# Patient Record
Sex: FEMALE | Race: WHITE | NOT HISPANIC OR LATINO | Employment: FULL TIME | URBAN - METROPOLITAN AREA
[De-identification: names, ages, dates, MRNs, and addresses within clinical notes are randomized per-mention and may not be internally consistent; named-entity substitution may affect disease eponyms.]

---

## 2019-01-11 ENCOUNTER — HOSPITAL ENCOUNTER (OUTPATIENT)
Dept: CT IMAGING | Facility: HOSPITAL | Age: 54
Discharge: HOME | End: 2019-01-11
Attending: INTERNAL MEDICINE | Admitting: INTERNAL MEDICINE

## 2020-03-05 ENCOUNTER — APPOINTMENT (OUTPATIENT)
Dept: GENERAL RADIOLOGY | Facility: HOSPITAL | Age: 55
End: 2020-03-05

## 2020-03-05 ENCOUNTER — HOSPITAL ENCOUNTER (EMERGENCY)
Facility: HOSPITAL | Age: 55
Discharge: HOME | End: 2020-03-05
Attending: EMERGENCY MEDICINE | Admitting: EMERGENCY MEDICINE

## 2020-03-05 VITALS
SYSTOLIC BLOOD PRESSURE: 93 MMHG | HEIGHT: 62 IN | DIASTOLIC BLOOD PRESSURE: 58 MMHG | WEIGHT: 133.38 LBS | RESPIRATION RATE: 19 BRPM | TEMPERATURE: 97.5 F | HEART RATE: 83 BPM | BODY MASS INDEX: 24.54 KG/M2 | OXYGEN SATURATION: 98 %

## 2020-03-05 DIAGNOSIS — R10.13 EPIGASTRIC PAIN: ICD-10-CM

## 2020-03-05 DIAGNOSIS — R07.9 CHEST PAIN, UNSPECIFIED TYPE: Primary | ICD-10-CM

## 2020-03-05 LAB
ALBUMIN SERPL-MCNC: 4.5 G/DL (ref 3.5–5.2)
ALBUMIN/GLOB SERPL: 1.6 G/DL
ALP SERPL-CCNC: 62 U/L (ref 39–117)
ALT SERPL W P-5'-P-CCNC: 29 U/L (ref 1–33)
ANION GAP SERPL CALCULATED.3IONS-SCNC: 11 MMOL/L (ref 5–15)
AST SERPL-CCNC: 43 U/L (ref 1–32)
BASOPHILS # BLD AUTO: 0.1 10*3/MM3 (ref 0–0.2)
BASOPHILS NFR BLD AUTO: 0.7 % (ref 0–1.5)
BILIRUB SERPL-MCNC: 0.9 MG/DL (ref 0.2–1.2)
BUN BLD-MCNC: 21 MG/DL (ref 6–20)
BUN/CREAT SERPL: 31.8 (ref 7–25)
CALCIUM SPEC-SCNC: 9.1 MG/DL (ref 8.6–10.5)
CHLORIDE SERPL-SCNC: 103 MMOL/L (ref 98–107)
CO2 SERPL-SCNC: 25 MMOL/L (ref 22–29)
CREAT BLD-MCNC: 0.66 MG/DL (ref 0.57–1)
D DIMER PPP FEU-MCNC: 0.35 MCGFEU/ML (ref 0.17–0.59)
DEPRECATED RDW RBC AUTO: 42.4 FL (ref 37–54)
EOSINOPHIL # BLD AUTO: 0 10*3/MM3 (ref 0–0.4)
EOSINOPHIL NFR BLD AUTO: 0.2 % (ref 0.3–6.2)
ERYTHROCYTE [DISTWIDTH] IN BLOOD BY AUTOMATED COUNT: 13.8 % (ref 12.3–15.4)
GFR SERPL CREATININE-BSD FRML MDRD: 93 ML/MIN/1.73
GLOBULIN UR ELPH-MCNC: 2.9 GM/DL
GLUCOSE BLD-MCNC: 119 MG/DL (ref 65–99)
HCT VFR BLD AUTO: 43.4 % (ref 34–46.6)
HGB BLD-MCNC: 15.1 G/DL (ref 12–15.9)
HOLD SPECIMEN: NORMAL
LYMPHOCYTES # BLD AUTO: 0.1 10*3/MM3 (ref 0.7–3.1)
LYMPHOCYTES NFR BLD AUTO: 1.9 % (ref 19.6–45.3)
MCH RBC QN AUTO: 30.4 PG (ref 26.6–33)
MCHC RBC AUTO-ENTMCNC: 34.9 G/DL (ref 31.5–35.7)
MCV RBC AUTO: 87.3 FL (ref 79–97)
MONOCYTES # BLD AUTO: 0.3 10*3/MM3 (ref 0.1–0.9)
MONOCYTES NFR BLD AUTO: 4.2 % (ref 5–12)
NEUTROPHILS # BLD AUTO: 7 10*3/MM3 (ref 1.7–7)
NEUTROPHILS NFR BLD AUTO: 93 % (ref 42.7–76)
NRBC BLD AUTO-RTO: 0 /100 WBC (ref 0–0.2)
PLATELET # BLD AUTO: 229 10*3/MM3 (ref 140–450)
PMV BLD AUTO: 7.5 FL (ref 6–12)
POTASSIUM BLD-SCNC: 3.8 MMOL/L (ref 3.5–5.2)
PROT SERPL-MCNC: 7.4 G/DL (ref 6–8.5)
RBC # BLD AUTO: 4.98 10*6/MM3 (ref 3.77–5.28)
SODIUM BLD-SCNC: 139 MMOL/L (ref 136–145)
TROPONIN T SERPL-MCNC: <0.01 NG/ML (ref 0–0.03)
TSH SERPL DL<=0.05 MIU/L-ACNC: 1.09 UIU/ML (ref 0.27–4.2)
WBC NRBC COR # BLD: 7.5 10*3/MM3 (ref 3.4–10.8)

## 2020-03-05 PROCEDURE — 84443 ASSAY THYROID STIM HORMONE: CPT | Performed by: NURSE PRACTITIONER

## 2020-03-05 PROCEDURE — 85379 FIBRIN DEGRADATION QUANT: CPT | Performed by: NURSE PRACTITIONER

## 2020-03-05 PROCEDURE — 93005 ELECTROCARDIOGRAM TRACING: CPT

## 2020-03-05 PROCEDURE — 93005 ELECTROCARDIOGRAM TRACING: CPT | Performed by: EMERGENCY MEDICINE

## 2020-03-05 PROCEDURE — 71045 X-RAY EXAM CHEST 1 VIEW: CPT

## 2020-03-05 PROCEDURE — 99284 EMERGENCY DEPT VISIT MOD MDM: CPT

## 2020-03-05 PROCEDURE — 85025 COMPLETE CBC W/AUTO DIFF WBC: CPT | Performed by: NURSE PRACTITIONER

## 2020-03-05 PROCEDURE — 93005 ELECTROCARDIOGRAM TRACING: CPT | Performed by: NURSE PRACTITIONER

## 2020-03-05 PROCEDURE — 80053 COMPREHEN METABOLIC PANEL: CPT | Performed by: NURSE PRACTITIONER

## 2020-03-05 PROCEDURE — 84484 ASSAY OF TROPONIN QUANT: CPT | Performed by: NURSE PRACTITIONER

## 2020-03-05 RX ORDER — PANTOPRAZOLE SODIUM 40 MG/1
40 TABLET, DELAYED RELEASE ORAL DAILY
Qty: 14 TABLET | Refills: 0 | Status: SHIPPED | OUTPATIENT
Start: 2020-03-05 | End: 2023-02-20

## 2020-03-05 RX ORDER — SODIUM CHLORIDE 0.9 % (FLUSH) 0.9 %
10 SYRINGE (ML) INJECTION AS NEEDED
Status: DISCONTINUED | OUTPATIENT
Start: 2020-03-05 | End: 2020-03-05 | Stop reason: HOSPADM

## 2020-03-05 NOTE — ED PROVIDER NOTES
Subjective   Patient is a 54-year-old female complaint of epigastric and chest pain.  This developed over the past 24 hours.  The pain lasts approximately 30 minutes at a time.  The pain is described as a sharp pain which is mild without radiation.  Denies cough fever shortness of breath vomiting or other complaint.          Review of Systems  Negative for headache ears throat cough fever shortness of breath vomiting diarrhea dysuria cuts weight loss or other complaint  No past medical history on file.    Allergies   Allergen Reactions   • Penicillins Anaphylaxis   • Cephalosporins Hives   • Shellfish-Derived Products Hives       No past surgical history on file.    No family history on file.    Social History     Socioeconomic History   • Marital status:      Spouse name: Not on file   • Number of children: Not on file   • Years of education: Not on file   • Highest education level: Not on file           Objective   Physical Exam  HEENT exam shows TMs to be clear.  Oropharynx comers but sclerae nonicteric.  He has no adenopathy JVD or bruits.  Lungs are clear.  Heart has a regular rate and rhythm without murmur rub or gallop.  Chest is nontender.  Abdomen is soft with minimal epigastric tenderness.  Patient has normal bowel sounds.  Back has no CVA tenderness.  M exam is no cyanosis or edema.  Procedures  My EKG interpretation shows normal sinus rhythm with no acute ST change     Results for orders placed or performed during the hospital encounter of 03/05/20   Comprehensive Metabolic Panel   Result Value Ref Range    Glucose 119 (H) 65 - 99 mg/dL    BUN 21 (H) 6 - 20 mg/dL    Creatinine 0.66 0.57 - 1.00 mg/dL    Sodium 139 136 - 145 mmol/L    Potassium 3.8 3.5 - 5.2 mmol/L    Chloride 103 98 - 107 mmol/L    CO2 25.0 22.0 - 29.0 mmol/L    Calcium 9.1 8.6 - 10.5 mg/dL    Total Protein 7.4 6.0 - 8.5 g/dL    Albumin 4.50 3.50 - 5.20 g/dL    ALT (SGPT) 29 1 - 33 U/L    AST (SGOT) 43 (H) 1 - 32 U/L    Alkaline  Phosphatase 62 39 - 117 U/L    Total Bilirubin 0.9 0.2 - 1.2 mg/dL    eGFR Non African Amer 93 >60 mL/min/1.73    Globulin 2.9 gm/dL    A/G Ratio 1.6 g/dL    BUN/Creatinine Ratio 31.8 (H) 7.0 - 25.0    Anion Gap 11.0 5.0 - 15.0 mmol/L   Troponin   Result Value Ref Range    Troponin T <0.010 0.000 - 0.030 ng/mL   D-dimer, Quantitative   Result Value Ref Range    D-Dimer, Quantitative 0.35 0.17 - 0.59 MCGFEU/mL   TSH   Result Value Ref Range    TSH 1.090 0.270 - 4.200 uIU/mL   CBC Auto Differential   Result Value Ref Range    WBC 7.50 3.40 - 10.80 10*3/mm3    RBC 4.98 3.77 - 5.28 10*6/mm3    Hemoglobin 15.1 12.0 - 15.9 g/dL    Hematocrit 43.4 34.0 - 46.6 %    MCV 87.3 79.0 - 97.0 fL    MCH 30.4 26.6 - 33.0 pg    MCHC 34.9 31.5 - 35.7 g/dL    RDW 13.8 12.3 - 15.4 %    RDW-SD 42.4 37.0 - 54.0 fl    MPV 7.5 6.0 - 12.0 fL    Platelets 229 140 - 450 10*3/mm3    Neutrophil % 93.0 (H) 42.7 - 76.0 %    Lymphocyte % 1.9 (L) 19.6 - 45.3 %    Monocyte % 4.2 (L) 5.0 - 12.0 %    Eosinophil % 0.2 (L) 0.3 - 6.2 %    Basophil % 0.7 0.0 - 1.5 %    Neutrophils, Absolute 7.00 1.70 - 7.00 10*3/mm3    Lymphocytes, Absolute 0.10 (L) 0.70 - 3.10 10*3/mm3    Monocytes, Absolute 0.30 0.10 - 0.90 10*3/mm3    Eosinophils, Absolute 0.00 0.00 - 0.40 10*3/mm3    Basophils, Absolute 0.10 0.00 - 0.20 10*3/mm3    nRBC 0.0 0.0 - 0.2 /100 WBC   Gold Top - SST   Result Value Ref Range    Extra Tube Hold for add-ons.      Xr Chest 1 View    Result Date: 3/5/2020  No acute cardiopulmonary process.  Electronically Signed By-Jaimee Sanchez On:3/5/2020 3:13 PM This report was finalized on [id number] by  Jaimee Sanchez, .        ED Course  ED Course as of Mar 05 1600   Thu Mar 05, 2020   1423 She is a 54-year-old white female comes in with complaints of chest pain off and on for 2 weeks today got worse though states around about 1130 started getting pain in her chest radiating up into her arm lasted a few minutes thing went out to her car and then it got worse  lasted 20 to 30 minutes total to get short of breath with it did get nauseated with it denies any cough no fever is not a smoker no history of DVTs no palpitations has been under a little bit of stress states really was not doing anything when the pain hit no recent surgery no leg swelling not a smoker will work-up for cardiac and rule PE    [JM]      ED Course User Index  [JOE] Tatianna Edwards, MAYUR                                           MDM  Number of Diagnoses or Management Options  Diagnosis management comments: Patient has benign physical exam.  There is no evidence acute condition based on EKG and troponin.  Metabolic panel is normal there is no evidence of infectious process.  Patient is pain-free on reexamination patient will be discharged and will be placed on Protonix.  They will follow outpatient with your family physician after an outpatient stress test is performed.       Amount and/or Complexity of Data Reviewed  Tests in the medicine section of CPT®: reviewed    Risk of Complications, Morbidity, and/or Mortality  Presenting problems: high  Diagnostic procedures: high  Management options: high    Patient Progress  Patient progress: stable      Final diagnoses:   Chest pain, unspecified type   Epigastric pain            Norris Harris MD  03/05/20 1600

## 2020-03-12 ENCOUNTER — OFFICE VISIT (OUTPATIENT)
Dept: CARDIOLOGY | Facility: CLINIC | Age: 55
End: 2020-03-12

## 2020-03-12 VITALS
SYSTOLIC BLOOD PRESSURE: 106 MMHG | HEIGHT: 62 IN | WEIGHT: 134.5 LBS | BODY MASS INDEX: 24.75 KG/M2 | HEART RATE: 67 BPM | OXYGEN SATURATION: 100 % | DIASTOLIC BLOOD PRESSURE: 67 MMHG

## 2020-03-12 DIAGNOSIS — R07.89 CHEST PRESSURE: Primary | ICD-10-CM

## 2020-03-12 PROCEDURE — 99204 OFFICE O/P NEW MOD 45 MIN: CPT | Performed by: INTERNAL MEDICINE

## 2020-03-12 NOTE — PROGRESS NOTES
Encounter Date:03/12/2020  New patient      Patient ID: Liyah Cardenas is a 54 y.o. female.    Chief Complaint:  Chest discomfort    History of Present Illness  The patient is a pleasant 54-year-old white female is seen for evaluation of chest discomfort.  Patient was at work last Thursday, 3/5/2020 when she had sudden onset of chest heaviness and tightness spreading across the chest associated with shortness of breath and sweating.  She did have some nausea prior to that.  The symptoms lasted for about 5 to 10 minutes.  Patient went to outside and into the car and subsequently came to emergency room at [facility] Where she was evaluated and released home.  Troponin levels were negative.  EKG was normal.  Patient did not have further chest discomfort.  No other associated aggravating or alleviating factors.  The symptoms were significant.    Patient was not having any palpitations, dizziness or syncope.  Denies having any headache ,abdominal pain ,nausea, vomiting , diarrhea constipation, loss of weight or loss of appetite.  Denies having any excessive bruising ,hematuria or blood in the stool.    Review of all systems negative except as indicated    .  Assessment and Plan     ]]]]]]]]]]]]]]]]]]  Impression  ========  -Chest discomfort- suggestive of angina pectoris.  Recent EKG was normal.  Troponin levels were normal.  CBC CMP and TSH were normal    - Family history of coronary artery disease.    -Status post cholecystectomy right arm surgery    -Non-smoker    -Allergic to shellfish penicillin cephalosporins  ==========  Plan  ======  Patient symptoms are concerning for angina pectoris  EKG from the emergency room showed sinus rhythm without any ischemic changes.  Ischemic cardiac work-up.  Stress Cardiolite test  Echocardiogram  Medications were reviewed and updated.  Follow-up in the office on the same day.  Further plan will depend on patient's progress.  [[[[[[[[[[[     Diagnosis Plan   1. Chest  pressure  Adult Transthoracic Echo Complete W/ Cont if Necessary Per Protocol    Stress Test With Myocardial Perfusion One Day   LAB RESULTS (LAST 7 DAYS)    CBC  Results from last 7 days   Lab Units 03/05/20  1429   WBC 10*3/mm3 7.50   RBC 10*6/mm3 4.98   HEMOGLOBIN g/dL 15.1   HEMATOCRIT % 43.4   MCV fL 87.3   PLATELETS 10*3/mm3 229       BMP  Results from last 7 days   Lab Units 03/05/20  1430   SODIUM mmol/L 139   POTASSIUM mmol/L 3.8   CHLORIDE mmol/L 103   CO2 mmol/L 25.0   BUN mg/dL 21*   CREATININE mg/dL 0.66   GLUCOSE mg/dL 119*       CMP   Results from last 7 days   Lab Units 03/05/20  1430   SODIUM mmol/L 139   POTASSIUM mmol/L 3.8   CHLORIDE mmol/L 103   CO2 mmol/L 25.0   BUN mg/dL 21*   CREATININE mg/dL 0.66   GLUCOSE mg/dL 119*   ALBUMIN g/dL 4.50   BILIRUBIN mg/dL 0.9   ALK PHOS U/L 62   AST (SGOT) U/L 43*   ALT (SGPT) U/L 29         BNP        TROPONIN  Results from last 7 days   Lab Units 03/05/20  1430   TROPONIN T ng/mL <0.010       CoAg        Creatinine Clearance  Estimated Creatinine Clearance: 81.9 mL/min (by C-G formula based on SCr of 0.66 mg/dL).    ABG        Radiology  No radiology results for the last day                The following portions of the patient's history were reviewed and updated as appropriate: allergies, current medications, past family history, past medical history, past social history, past surgical history and problem list.    Review of Systems   Constitution: Negative for malaise/fatigue.   Cardiovascular: Positive for chest pain (dull). Negative for leg swelling, palpitations and syncope.   Respiratory: Positive for shortness of breath (with exercise).    Skin: Negative for rash.   Gastrointestinal: Negative for nausea and vomiting.   Neurological: Positive for light-headedness. Negative for dizziness and numbness.         Current Outpatient Medications:   •  Acetaminophen 325 MG capsule, Take  by mouth., Disp: , Rfl:   •  pantoprazole (PROTONIX) 40 MG EC tablet, Take  "1 tablet by mouth Daily., Disp: 14 tablet, Rfl: 0    Allergies   Allergen Reactions   • Penicillins Anaphylaxis   • Cephalosporins Hives   • Shellfish-Derived Products Hives       History reviewed. No pertinent family history.    History reviewed. No pertinent surgical history.    History reviewed. No pertinent past medical history.    History reviewed. No pertinent family history.    Social History     Socioeconomic History   • Marital status:      Spouse name: Not on file   • Number of children: Not on file   • Years of education: Not on file   • Highest education level: Not on file   Tobacco Use   • Smoking status: Never Smoker   • Smokeless tobacco: Never Used   Substance and Sexual Activity   • Alcohol use: Never     Frequency: Never         Procedures      Objective:       Physical Exam    /67 (BP Location: Left arm, Patient Position: Sitting, Cuff Size: Adult)   Pulse 67   Ht 157.5 cm (62\")   Wt 61 kg (134 lb 8 oz)   SpO2 100%   BMI 24.60 kg/m²   The patient is alert, oriented and in no distress.    Vital signs as noted above.    Head and neck revealed no carotid bruits or jugular venous distension.  No thyromegaly or lymphadenopathy is present.    Lungs clear.  No wheezing.  Breath sounds are normal bilaterally.    Heart normal first and second heart sounds.  No murmur..  No pericardial rub is present.  No gallop is present.    Abdomen soft and nontender.  No organomegaly is present.    Extremities revealed good peripheral pulses without any pedal edema.    Skin warm and dry.    Musculoskeletal system is grossly normal.    CNS grossly normal.        "

## 2020-03-23 ENCOUNTER — HOSPITAL ENCOUNTER (OUTPATIENT)
Dept: CARDIOLOGY | Facility: HOSPITAL | Age: 55
Discharge: HOME | End: 2020-03-23

## 2020-03-23 ENCOUNTER — OFFICE VISIT (OUTPATIENT)
Dept: CARDIOLOGY | Facility: CLINIC | Age: 55
End: 2020-03-23

## 2020-03-23 ENCOUNTER — HOSPITAL ENCOUNTER (OUTPATIENT)
Dept: CARDIOLOGY | Facility: HOSPITAL | Age: 55
Discharge: HOME | End: 2020-03-23
Admitting: INTERNAL MEDICINE

## 2020-03-23 VITALS
SYSTOLIC BLOOD PRESSURE: 98 MMHG | BODY MASS INDEX: 25.03 KG/M2 | DIASTOLIC BLOOD PRESSURE: 67 MMHG | HEART RATE: 74 BPM | OXYGEN SATURATION: 98 % | HEIGHT: 62 IN | WEIGHT: 136 LBS

## 2020-03-23 VITALS
BODY MASS INDEX: 23.55 KG/M2 | SYSTOLIC BLOOD PRESSURE: 102 MMHG | HEIGHT: 62 IN | WEIGHT: 128 LBS | DIASTOLIC BLOOD PRESSURE: 60 MMHG

## 2020-03-23 DIAGNOSIS — R07.89 CHEST PRESSURE: ICD-10-CM

## 2020-03-23 DIAGNOSIS — R07.89 DISCOMFORT IN CHEST: Primary | ICD-10-CM

## 2020-03-23 LAB
BH CV ECHO MEAS - ACS: 2 CM
BH CV ECHO MEAS - AO MAX PG (FULL): 2 MMHG
BH CV ECHO MEAS - AO MAX PG: 6.3 MMHG
BH CV ECHO MEAS - AO MEAN PG (FULL): 1.9 MMHG
BH CV ECHO MEAS - AO MEAN PG: 4 MMHG
BH CV ECHO MEAS - AO ROOT AREA: 5.6 CM^2
BH CV ECHO MEAS - AO ROOT DIAM: 2.7 CM
BH CV ECHO MEAS - AO V2 MAX: 125.8 CM/SEC
BH CV ECHO MEAS - AO V2 MEAN: 97 CM/SEC
BH CV ECHO MEAS - AO V2 VTI: 27.8 CM
BH CV ECHO MEAS - ASC AORTA: 2.7 CM
BH CV ECHO MEAS - AVA(I,A): 2 CM^2
BH CV ECHO MEAS - AVA(I,D): 2 CM^2
BH CV ECHO MEAS - AVA(V,A): 2.4 CM^2
BH CV ECHO MEAS - AVA(V,D): 2.4 CM^2
BH CV ECHO MEAS - EDV(CUBED): 76.6 ML
BH CV ECHO MEAS - EDV(MOD-SP4): 79.1 ML
BH CV ECHO MEAS - EDV(TEICH): 80.6 ML
BH CV ECHO MEAS - EF(CUBED): 82.4 %
BH CV ECHO MEAS - EF(MOD-SP4): 68.6 %
BH CV ECHO MEAS - EF(TEICH): 75.6 %
BH CV ECHO MEAS - ESV(CUBED): 13.4 ML
BH CV ECHO MEAS - ESV(MOD-SP4): 24.9 ML
BH CV ECHO MEAS - ESV(TEICH): 19.7 ML
BH CV ECHO MEAS - FS: 44 %
BH CV ECHO MEAS - IVS/LVPW: 0.89
BH CV ECHO MEAS - IVSD: 0.7 CM
BH CV ECHO MEAS - LA DIMENSION: 3.9 CM
BH CV ECHO MEAS - LA/AO: 1.5
BH CV ECHO MEAS - LV MASS(C)D: 93.2 GRAMS
BH CV ECHO MEAS - LV MAX PG: 4.3 MMHG
BH CV ECHO MEAS - LV MEAN PG: 2.1 MMHG
BH CV ECHO MEAS - LV V1 MAX: 104 CM/SEC
BH CV ECHO MEAS - LV V1 MEAN: 67.6 CM/SEC
BH CV ECHO MEAS - LV V1 VTI: 18.9 CM
BH CV ECHO MEAS - LVIDD: 4.2 CM
BH CV ECHO MEAS - LVIDS: 2.4 CM
BH CV ECHO MEAS - LVOT AREA: 2.9 CM^2
BH CV ECHO MEAS - LVOT DIAM: 1.9 CM
BH CV ECHO MEAS - LVPWD: 0.78 CM
BH CV ECHO MEAS - MV A MAX VEL: 71.8 CM/SEC
BH CV ECHO MEAS - MV DEC SLOPE: 552.1 CM/SEC^2
BH CV ECHO MEAS - MV DEC TIME: 0.17 SEC
BH CV ECHO MEAS - MV E MAX VEL: 92.8 CM/SEC
BH CV ECHO MEAS - MV E/A: 1.3
BH CV ECHO MEAS - MV MAX PG: 3.9 MMHG
BH CV ECHO MEAS - MV MEAN PG: 1.6 MMHG
BH CV ECHO MEAS - MV V2 MAX: 98.5 CM/SEC
BH CV ECHO MEAS - MV V2 MEAN: 58.9 CM/SEC
BH CV ECHO MEAS - MV V2 VTI: 24.7 CM
BH CV ECHO MEAS - MVA(VTI): 2.3 CM^2
BH CV ECHO MEAS - RAP SYSTOLE: 3 MMHG
BH CV ECHO MEAS - RVDD: 3.2 CM
BH CV ECHO MEAS - RVSP: 16.9 MMHG
BH CV ECHO MEAS - SV(AO): 156.1 ML
BH CV ECHO MEAS - SV(CUBED): 63.1 ML
BH CV ECHO MEAS - SV(LVOT): 55.8 ML
BH CV ECHO MEAS - SV(MOD-SP4): 54.3 ML
BH CV ECHO MEAS - SV(TEICH): 60.9 ML
BH CV ECHO MEAS - TR MAX VEL: 186.2 CM/SEC
BH CV STRESS BP STAGE 1: NORMAL
BH CV STRESS BP STAGE 2: NORMAL
BH CV STRESS BP STAGE 3: NORMAL
BH CV STRESS DURATION MIN STAGE 1: 3
BH CV STRESS DURATION MIN STAGE 2: 3
BH CV STRESS DURATION MIN STAGE 3: 3
BH CV STRESS DURATION SEC STAGE 1: 0
BH CV STRESS DURATION SEC STAGE 2: 0
BH CV STRESS DURATION SEC STAGE 3: 0
BH CV STRESS GRADE STAGE 1: 10
BH CV STRESS GRADE STAGE 2: 12
BH CV STRESS GRADE STAGE 3: 14
BH CV STRESS HR STAGE 1: 102
BH CV STRESS HR STAGE 2: 123
BH CV STRESS HR STAGE 3: 138
BH CV STRESS METS STAGE 1: 5
BH CV STRESS METS STAGE 2: 7.5
BH CV STRESS METS STAGE 3: 10
BH CV STRESS PROTOCOL 1: NORMAL
BH CV STRESS RECOVERY BP: NORMAL MMHG
BH CV STRESS RECOVERY HR: 97 BPM
BH CV STRESS SPEED STAGE 1: 1.7
BH CV STRESS SPEED STAGE 2: 2.5
BH CV STRESS SPEED STAGE 3: 3.4
BH CV STRESS STAGE 1: 1
BH CV STRESS STAGE 2: 2
BH CV STRESS STAGE 3: 3
LV EF 2D ECHO EST: 60 %
MAXIMAL PREDICTED HEART RATE: 164 BPM
MAXIMAL PREDICTED HEART RATE: 164 BPM
PERCENT MAX PREDICTED HR: 85.37 %
STRESS BASELINE BP: NORMAL MMHG
STRESS BASELINE HR: 66 BPM
STRESS PERCENT HR: 100 %
STRESS POST EXERCISE DUR MIN: 9 MIN
STRESS POST EXERCISE DUR SEC: 55 SEC
STRESS POST PEAK HR: 140 BPM
STRESS TARGET HR: 139 BPM
STRESS TARGET HR: 139 BPM

## 2020-03-23 PROCEDURE — 0 TECHNETIUM SESTAMIBI: Performed by: INTERNAL MEDICINE

## 2020-03-23 PROCEDURE — A9500 TC99M SESTAMIBI: HCPCS | Performed by: INTERNAL MEDICINE

## 2020-03-23 PROCEDURE — 78452 HT MUSCLE IMAGE SPECT MULT: CPT

## 2020-03-23 PROCEDURE — 93017 CV STRESS TEST TRACING ONLY: CPT

## 2020-03-23 PROCEDURE — 99213 OFFICE O/P EST LOW 20 MIN: CPT | Performed by: INTERNAL MEDICINE

## 2020-03-23 PROCEDURE — 93306 TTE W/DOPPLER COMPLETE: CPT

## 2020-03-23 PROCEDURE — 78452 HT MUSCLE IMAGE SPECT MULT: CPT | Performed by: INTERNAL MEDICINE

## 2020-03-23 PROCEDURE — 93306 TTE W/DOPPLER COMPLETE: CPT | Performed by: INTERNAL MEDICINE

## 2020-03-23 PROCEDURE — 93018 CV STRESS TEST I&R ONLY: CPT | Performed by: INTERNAL MEDICINE

## 2020-03-23 RX ORDER — IBUPROFEN 800 MG/1
800 TABLET ORAL EVERY 6 HOURS PRN
COMMUNITY

## 2020-03-23 RX ADMIN — TECHNETIUM TC 99M SESTAMIBI 1 DOSE: 1 INJECTION INTRAVENOUS at 13:30

## 2020-03-23 RX ADMIN — TECHNETIUM TC 99M SESTAMIBI 1 DOSE: 1 INJECTION INTRAVENOUS at 15:15

## 2020-03-23 NOTE — PROGRESS NOTES
Encounter Date:03/23/2020  Last seen 3/12/2020      Patient ID: Liyah Cardenas is a 54 y.o. female.    Chief Complaint:  Chest discomfort     History of Present Illness  The patient is a pleasant 54-year-old white female is seen for evaluation of chest discomfort.  Patient was at work last Thursday, 3/5/2020 when she had sudden onset of chest heaviness and tightness spreading across the chest associated with shortness of breath and sweating.  She did have some nausea prior to that.  The symptoms lasted for about 5 to 10 minutes.  Patient went to outside and into the car and subsequently came to emergency room at [facility] Where she was evaluated and released home.  Troponin levels were negative.  EKG was normal.  Patient did not have further chest discomfort.  No other associated aggravating or alleviating factors.  The symptoms were significant.     Patient was not having any palpitations, dizziness or syncope.  Denies having any headache ,abdominal pain ,nausea, vomiting , diarrhea constipation, loss of weight or loss of appetite.  Denies having any excessive bruising ,hematuria or blood in the stool.     Review of all systems negative except as indicated     .  Assessment and Plan      ]]]]]]]]]]]]]]]]]]  Impression  ========  -Chest discomfort- suggestive of angina pectoris.  Recent EKG was normal.  Troponin levels were normal.  CBC CMP and TSH were normal    Echocardiogram-normal 3/23/2020  Stress Cardiolite test 3/20/2020-normal    - Family history of coronary artery disease.     -Status post cholecystectomy right arm surgery     -Non-smoker     -Allergic to shellfish penicillin cephalosporins  ==========  Plan  ======  Patient symptoms are concerning for angina pectoris  EKG from the emergency room showed sinus rhythm without any ischemic changes.  Ischemic cardiac work-up.  Stress Cardiolite test-normal as above  Echocardiogram-normal as above  Medications were reviewed and  updated.  Follow-up in the office in 4 weeks.  Consideration would be given for cardiac catheterization if patient has continued symptoms.  Further plan will depend on patient's progress.  [[[[[[[[[[             Diagnosis Plan   1. Discomfort in chest     LAB RESULTS (LAST 7 DAYS)    CBC        BMP        CMP         BNP        TROPONIN        CoAg        Creatinine Clearance  Estimated Creatinine Clearance: 82.2 mL/min (by C-G formula based on SCr of 0.66 mg/dL).    ABG        Radiology  No radiology results for the last day                The following portions of the patient's history were reviewed and updated as appropriate: allergies, current medications, past family history, past medical history, past social history, past surgical history and problem list.    Review of Systems   Constitution: Negative for fever and malaise/fatigue.   HENT: Negative for congestion and hearing loss.    Eyes: Negative for double vision and visual disturbance.   Cardiovascular: Negative for chest pain, claudication, dyspnea on exertion, leg swelling and syncope.   Respiratory: Negative for cough and shortness of breath.    Endocrine: Negative for cold intolerance.   Skin: Negative for color change and rash.   Musculoskeletal: Negative for arthritis and joint pain.   Gastrointestinal: Negative for abdominal pain and heartburn.   Genitourinary: Negative for hematuria.   Neurological: Negative for excessive daytime sleepiness and dizziness.   Psychiatric/Behavioral: Negative for depression. The patient is not nervous/anxious.    All other systems reviewed and are negative.        Current Outpatient Medications:   •  Acetaminophen 325 MG capsule, Take  by mouth., Disp: , Rfl:   •  ibuprofen (ADVIL,MOTRIN) 800 MG tablet, Take 800 mg by mouth Every 6 (Six) Hours As Needed for Mild Pain ., Disp: , Rfl:   •  pantoprazole (PROTONIX) 40 MG EC tablet, Take 1 tablet by mouth Daily., Disp: 14 tablet, Rfl: 0  No current facility-administered  "medications for this visit.     Facility-Administered Medications Ordered in Other Visits:   •  [COMPLETED] technetium sestamibi (CARDIOLITE) injection 1 dose, 1 dose, Intravenous, Once in imaging, Anmol Ngo MD, 1 dose at 03/23/20 7381    Allergies   Allergen Reactions   • Penicillins Anaphylaxis   • Cephalosporins Hives   • Shellfish-Derived Products Hives       Family History   Problem Relation Age of Onset   • Heart disease Father    • Heart attack Father    • Hypertension Mother        Past Surgical History:   Procedure Laterality Date   • ARM TENDON REPAIR  2015       History reviewed. No pertinent past medical history.    Family History   Problem Relation Age of Onset   • Heart disease Father    • Heart attack Father    • Hypertension Mother        Social History     Socioeconomic History   • Marital status:      Spouse name: Not on file   • Number of children: Not on file   • Years of education: Not on file   • Highest education level: Not on file   Tobacco Use   • Smoking status: Never Smoker   • Smokeless tobacco: Never Used   Substance and Sexual Activity   • Alcohol use: Never     Frequency: Never   • Drug use: Never   • Sexual activity: Defer         Procedures      Objective:       Physical Exam    BP 98/67   Pulse 74   Ht 157.5 cm (62\")   Wt 61.7 kg (136 lb)   SpO2 98%   BMI 24.87 kg/m²   The patient is alert, oriented and in no distress.    Vital signs as noted above.    Head and neck revealed no carotid bruits or jugular venous distension.  No thyromegaly or lymphadenopathy is present.    Lungs clear.  No wheezing.  Breath sounds are normal bilaterally.    Heart normal first and second heart sounds.  No murmur..  No pericardial rub is present.  No gallop is present.    Abdomen soft and nontender.  No organomegaly is present.    Extremities revealed good peripheral pulses without any pedal edema.    Skin warm and dry.    Musculoskeletal system is grossly normal.    CNS grossly " normal.

## 2020-04-20 ENCOUNTER — TELEMEDICINE (OUTPATIENT)
Dept: CARDIOLOGY | Facility: CLINIC | Age: 55
End: 2020-04-20

## 2020-04-20 VITALS — WEIGHT: 136 LBS | BODY MASS INDEX: 25.03 KG/M2 | HEIGHT: 62 IN

## 2020-04-20 DIAGNOSIS — R07.89 CHEST PRESSURE: Primary | ICD-10-CM

## 2020-04-20 PROCEDURE — 99213 OFFICE O/P EST LOW 20 MIN: CPT | Performed by: INTERNAL MEDICINE

## 2020-04-20 NOTE — PROGRESS NOTES
You have chosen to receive care through a telehealth visit.  Do you consent to use a video/audio connection for your medical care today? Yes      Encounter Date:04/20/2020  Last seen 3/23/2020      Patient ID: Liyah Cardenas is a 54 y.o. female.    Chief Complaint:  History of chest discomfort      History of Present Illness    Since I have last seen, the patient has been without any chest discomfort ,shortness of breath, palpitations, dizziness or syncope.  Denies having any headache ,abdominal pain ,nausea, vomiting , diarrhea constipation, loss of weight or loss of appetite.  Denies having any excessive bruising ,hematuria or blood in the stool.    Review of all systems negative except as indicated  Assessment and Plan       ]]]]]]]]]]]]]]]]]]  Impression  ========  -Chest discomfort- suggestive of angina pectoris.  Recent EKG was normal.  Troponin levels were normal.  CBC CMP and TSH were normal     Echocardiogram-normal 3/23/2020  Stress Cardiolite test 3/20/2020-normal     - Family history of coronary artery disease.     -Status post cholecystectomy right arm surgery     -Non-smoker     -Allergic to shellfish penicillin cephalosporins  ==========  Plan  ======  Video/telephone visit-technical issues  Patient is not having any angina pectoris or congestive heart failure  Recent ischemic cardiac work-up.-Negative  Stress Cardiolite test-normal as above  Echocardiogram-normal as above  Medications were reviewed and updated.  Follow-up in the office in 6 months  Consideration would be given for cardiac catheterization if patient has continued symptoms.  Further plan will depend on patient's progress.  [[[[[[[[[[            Diagnosis Plan   1. Chest pressure     LAB RESULTS (LAST 7 DAYS)    CBC        BMP        CMP         BNP        TROPONIN        CoAg        Creatinine Clearance  CrCl cannot be calculated (Patient's most recent lab result is older than the maximum 30 days allowed.).    ABG         Radiology  No radiology results for the last day                The following portions of the patient's history were reviewed and updated as appropriate: allergies, current medications, past family history, past medical history, past social history, past surgical history and problem list.    Review of Systems   Constitution: Negative for malaise/fatigue.   Cardiovascular: Negative for chest pain, leg swelling, palpitations and syncope.   Respiratory: Negative for shortness of breath.    Skin: Negative for rash.   Gastrointestinal: Negative for nausea and vomiting.   Neurological: Negative for dizziness, light-headedness and numbness.         Current Outpatient Medications:   •  Acetaminophen 325 MG capsule, Take  by mouth., Disp: , Rfl:   •  Cetirizine HCl (ZyrTEC Allergy) 10 MG capsule, Take  by mouth., Disp: , Rfl:   •  ibuprofen (ADVIL,MOTRIN) 800 MG tablet, Take 800 mg by mouth Every 6 (Six) Hours As Needed for Mild Pain ., Disp: , Rfl:   •  pantoprazole (PROTONIX) 40 MG EC tablet, Take 1 tablet by mouth Daily., Disp: 14 tablet, Rfl: 0    Allergies   Allergen Reactions   • Penicillins Anaphylaxis   • Cephalosporins Hives   • Shellfish-Derived Products Hives       Family History   Problem Relation Age of Onset   • Heart disease Father    • Heart attack Father    • Hypertension Mother        Past Surgical History:   Procedure Laterality Date   • ARM TENDON REPAIR  2015       History reviewed. No pertinent past medical history.    Family History   Problem Relation Age of Onset   • Heart disease Father    • Heart attack Father    • Hypertension Mother        Social History     Socioeconomic History   • Marital status:      Spouse name: Not on file   • Number of children: Not on file   • Years of education: Not on file   • Highest education level: Not on file   Tobacco Use   • Smoking status: Never Smoker   • Smokeless tobacco: Never Used   Substance and Sexual Activity   • Alcohol use: Never      "Frequency: Never   • Drug use: Never   • Sexual activity: Defer         Procedures      Objective:       Physical Exam    Ht 157.5 cm (62\")   Wt 61.7 kg (136 lb)   BMI 24.87 kg/m²   The patient is alert, oriented and in no distress.    Vital signs as noted above.    No audible shortness of breath    Speech is normal.    CNS grossly normal.        "

## 2020-10-20 ENCOUNTER — OFFICE VISIT (OUTPATIENT)
Dept: CARDIOLOGY | Facility: CLINIC | Age: 55
End: 2020-10-20

## 2020-10-20 VITALS
SYSTOLIC BLOOD PRESSURE: 110 MMHG | HEIGHT: 62 IN | OXYGEN SATURATION: 99 % | WEIGHT: 146 LBS | BODY MASS INDEX: 26.87 KG/M2 | HEART RATE: 54 BPM | DIASTOLIC BLOOD PRESSURE: 68 MMHG

## 2020-10-20 DIAGNOSIS — R07.89 DISCOMFORT IN CHEST: ICD-10-CM

## 2020-10-20 DIAGNOSIS — R07.89 CHEST PRESSURE: Primary | ICD-10-CM

## 2020-10-20 PROCEDURE — 93000 ELECTROCARDIOGRAM COMPLETE: CPT | Performed by: INTERNAL MEDICINE

## 2020-10-20 PROCEDURE — 99213 OFFICE O/P EST LOW 20 MIN: CPT | Performed by: INTERNAL MEDICINE

## 2020-10-20 NOTE — PROGRESS NOTES
Encounter Date:10/20/2020  Last seen 4/20/2020      Patient ID: Liyah Cardenas is a 55 y.o. female.    Chief Complaint:  History of chest discomfort        History of Present Illness     Since I have last seen, the patient has been without any chest discomfort ,shortness of breath, palpitations, dizziness or syncope.  Denies having any headache ,abdominal pain ,nausea, vomiting , diarrhea constipation, loss of weight or loss of appetite.  Denies having any excessive bruising ,hematuria or blood in the stool.     Review of all systems negative except as indicated.    Reviewed ROS  Assessment and Plan         ]]]]]]]]]]]]]]]]]]  Impression  ========  -Chest discomfort- suggestive of angina pectoris.-Improved.  Recent EKG was normal.  Troponin levels were normal.  CBC CMP and TSH were normal     Echocardiogram-normal 3/23/2020  Stress Cardiolite test 3/20/2020-normal     - Family history of coronary artery disease.     -Status post cholecystectomy right arm surgery     -Non-smoker     -Allergic to shellfish penicillin cephalosporins  ==========  Plan  ======  Patient is not having any angina pectoris or congestive heart failure  EKG showed sinus rhythm without any ischemic changes  Medications were reviewed and updated.  Follow-up in the office in  1 year.  Further plan will depend on patient's progress.  [[[[[[[[[[             Diagnosis Plan   1. Chest pressure     2. Discomfort in chest     LAB RESULTS (LAST 7 DAYS)    CBC        BMP        CMP         BNP        TROPONIN        CoAg        Creatinine Clearance  CrCl cannot be calculated (Patient's most recent lab result is older than the maximum 30 days allowed.).    ABG        Radiology  No radiology results for the last day                The following portions of the patient's history were reviewed and updated as appropriate: allergies, current medications, past family history, past medical history, past social history, past surgical history and problem  list.    Review of Systems   Constitution: Negative for malaise/fatigue.   Cardiovascular: Negative for chest pain, leg swelling, palpitations and syncope.   Respiratory: Negative for shortness of breath.    Skin: Negative for rash.   Gastrointestinal: Negative for nausea and vomiting.   Neurological: Negative for dizziness, light-headedness and numbness.         Current Outpatient Medications:   •  Acetaminophen 325 MG capsule, Take  by mouth., Disp: , Rfl:   •  ALOE PO, Take  by mouth., Disp: , Rfl:   •  Cetirizine HCl (ZyrTEC Allergy) 10 MG capsule, Take  by mouth., Disp: , Rfl:   •  COLLAGEN PO, Take  by mouth., Disp: , Rfl:   •  Dexchlorpheniramine-Phenyleph (STAHIST PO), Take  by mouth., Disp: , Rfl:   •  ibuprofen (ADVIL,MOTRIN) 800 MG tablet, Take 800 mg by mouth Every 6 (Six) Hours As Needed for Mild Pain ., Disp: , Rfl:   •  Unable to find, PROVITALIZE, Disp: , Rfl:   •  Unable to find, UNIVERA LIQUID XTRA DRINK, Disp: , Rfl:   •  pantoprazole (PROTONIX) 40 MG EC tablet, Take 1 tablet by mouth Daily., Disp: 14 tablet, Rfl: 0    Allergies   Allergen Reactions   • Penicillins Anaphylaxis   • Cephalosporins Hives   • Shellfish-Derived Products Hives       Family History   Problem Relation Age of Onset   • Heart disease Father    • Heart attack Father    • Hypertension Mother        Past Surgical History:   Procedure Laterality Date   • ARM TENDON REPAIR  2015       No past medical history on file.    Family History   Problem Relation Age of Onset   • Heart disease Father    • Heart attack Father    • Hypertension Mother        Social History     Socioeconomic History   • Marital status:      Spouse name: Not on file   • Number of children: Not on file   • Years of education: Not on file   • Highest education level: Not on file   Tobacco Use   • Smoking status: Never Smoker   • Smokeless tobacco: Never Used   Substance and Sexual Activity   • Alcohol use: Never     Frequency: Never   • Drug use: Never  "  • Sexual activity: Defer           ECG 12 Lead    Date/Time: 10/20/2020 10:28 AM  Performed by: Anmol Ngo MD  Authorized by: Anmol Ngo MD   Comparison: compared with previous ECG   Similar to previous ECG  Comparison to previous ECG: Normal sinus rhythm nonspecific ST-T wave changes 57/min right axis deviation no ectopy normal intervals no change from 3/5/2020                Objective:       Physical Exam    /68 (BP Location: Left arm, Patient Position: Sitting, Cuff Size: Large Adult)   Pulse 54   Ht 157.5 cm (62\")   Wt 66.2 kg (146 lb)   SpO2 99%   BMI 26.70 kg/m²   The patient is alert, oriented and in no distress.    Vital signs as noted above.    Head and neck revealed no carotid bruits or jugular venous distension.  No thyromegaly or lymphadenopathy is present.    Lungs clear.  No wheezing.  Breath sounds are normal bilaterally.    Heart normal first and second heart sounds.  No murmur..  No pericardial rub is present.  No gallop is present.    Abdomen soft and nontender.  No organomegaly is present.    Extremities revealed good peripheral pulses without any pedal edema.    Skin warm and dry.    Musculoskeletal system is grossly normal.    CNS grossly normal.    Reviewed and unchanged from last visit.        "

## 2021-05-29 ENCOUNTER — HOSPITAL ENCOUNTER (EMERGENCY)
Facility: HOSPITAL | Age: 56
Discharge: HOME | End: 2021-05-29
Admitting: EMERGENCY MEDICINE

## 2021-05-29 ENCOUNTER — APPOINTMENT (OUTPATIENT)
Dept: CT IMAGING | Facility: HOSPITAL | Age: 56
End: 2021-05-29

## 2021-05-29 VITALS
HEIGHT: 62 IN | RESPIRATION RATE: 15 BRPM | BODY MASS INDEX: 25.96 KG/M2 | WEIGHT: 141.09 LBS | OXYGEN SATURATION: 100 % | HEART RATE: 67 BPM | DIASTOLIC BLOOD PRESSURE: 68 MMHG | SYSTOLIC BLOOD PRESSURE: 115 MMHG | TEMPERATURE: 98 F

## 2021-05-29 DIAGNOSIS — S06.0X0A CONCUSSION WITHOUT LOSS OF CONSCIOUSNESS, INITIAL ENCOUNTER: Primary | ICD-10-CM

## 2021-05-29 DIAGNOSIS — S09.90XA TRAUMATIC INJURY OF HEAD, INITIAL ENCOUNTER: ICD-10-CM

## 2021-05-29 PROCEDURE — 70450 CT HEAD/BRAIN W/O DYE: CPT

## 2021-05-29 PROCEDURE — 99282 EMERGENCY DEPT VISIT SF MDM: CPT

## 2021-05-29 RX ORDER — ONDANSETRON 4 MG/1
4 TABLET, FILM COATED ORAL EVERY 6 HOURS PRN
Qty: 11 TABLET | Refills: 0 | Status: SHIPPED | OUTPATIENT
Start: 2021-05-29

## 2021-10-26 ENCOUNTER — OFFICE VISIT (OUTPATIENT)
Dept: CARDIOLOGY | Facility: CLINIC | Age: 56
End: 2021-10-26

## 2021-10-26 VITALS
DIASTOLIC BLOOD PRESSURE: 71 MMHG | HEIGHT: 62 IN | WEIGHT: 137 LBS | BODY MASS INDEX: 25.21 KG/M2 | OXYGEN SATURATION: 99 % | SYSTOLIC BLOOD PRESSURE: 108 MMHG

## 2021-10-26 DIAGNOSIS — R07.89 CHEST PRESSURE: Primary | ICD-10-CM

## 2021-10-26 PROCEDURE — 99213 OFFICE O/P EST LOW 20 MIN: CPT | Performed by: INTERNAL MEDICINE

## 2021-10-26 PROCEDURE — 93000 ELECTROCARDIOGRAM COMPLETE: CPT | Performed by: INTERNAL MEDICINE

## 2021-10-26 NOTE — PROGRESS NOTES
Encounter Date:10/26/2021  Last seen 10/12/2020      Patient ID: Liyah Cardenas is a 56 y.o. female.    Chief Complaint:    History of chest discomfort     History of Present Illness     Since I have last seen, the patient has been without any chest discomfort ,shortness of breath, palpitations, dizziness or syncope.  Denies having any headache ,abdominal pain ,nausea, vomiting , diarrhea constipation, loss of weight or loss of appetite.  Denies having any excessive bruising ,hematuria or blood in the stool.    Review of all systems negative except as indicated.    Reviewed ROS.   Assessment and Plan         ]]]]]]]]]]]]]]]]]]  Impression  ========  -Chest discomfort- suggestive of angina pectoris.-Improved.  EKG was normal.     Echocardiogram-normal 3/23/2020  Stress Cardiolite test 3/20/2020-normal     - Family history of coronary artery disease.     -Status post cholecystectomy right arm surgery     -Non-smoker     -Allergic to shellfish penicillin cephalosporins  ==========  Plan  ======  Chest discomfort-improved  Patient is not having any angina pectoris or congestive heart failure  EKG showed sinus rhythm without any ischemic changes  Medications were reviewed and updated.  Follow-up in the office as needed.  Further plan will depend on patient's progress.  [[[[[[[[[[                   Diagnosis Plan   1. Chest pressure  ECG 12 Lead   LAB RESULTS (LAST 7 DAYS)    CBC        BMP        CMP         BNP        TROPONIN        CoAg        Creatinine Clearance  CrCl cannot be calculated (Patient's most recent lab result is older than the maximum 30 days allowed.).    ABG        Radiology  No radiology results for the last day                The following portions of the patient's history were reviewed and updated as appropriate: allergies, current medications, past family history, past medical history, past social history, past surgical history and problem list.    Review of Systems   Constitutional: Negative  for malaise/fatigue.   Cardiovascular: Negative for chest pain, leg swelling, palpitations and syncope.   Respiratory: Negative for shortness of breath.    Skin: Negative for rash.   Gastrointestinal: Negative for nausea and vomiting.   Neurological: Negative for dizziness, light-headedness and numbness.   All other systems reviewed and are negative.        Current Outpatient Medications:   •  ALOE PO, Take  by mouth., Disp: , Rfl:   •  Cetirizine HCl (ZyrTEC Allergy) 10 MG capsule, Take  by mouth., Disp: , Rfl:   •  COLLAGEN PO, Take  by mouth., Disp: , Rfl:   •  ibuprofen (ADVIL,MOTRIN) 800 MG tablet, Take 800 mg by mouth Every 6 (Six) Hours As Needed for Mild Pain ., Disp: , Rfl:   •  Unable to find, PROVITALIZE, Disp: , Rfl:   •  Unable to find, UNIVERA LIQUID XTRA DRINK, Disp: , Rfl:   •  Acetaminophen 325 MG capsule, Take  by mouth., Disp: , Rfl:   •  Dexchlorpheniramine-Phenyleph (STAHIST PO), Take  by mouth., Disp: , Rfl:   •  ondansetron (ZOFRAN) 4 MG tablet, Take 1 tablet by mouth Every 6 (Six) Hours As Needed for Nausea or Vomiting., Disp: 11 tablet, Rfl: 0  •  pantoprazole (PROTONIX) 40 MG EC tablet, Take 1 tablet by mouth Daily., Disp: 14 tablet, Rfl: 0    Allergies   Allergen Reactions   • Penicillins Anaphylaxis   • Cephalosporins Hives   • Shellfish-Derived Products Hives       Family History   Problem Relation Age of Onset   • Heart disease Father    • Heart attack Father    • Hypertension Mother        Past Surgical History:   Procedure Laterality Date   • ARM TENDON REPAIR  2015       History reviewed. No pertinent past medical history.    Family History   Problem Relation Age of Onset   • Heart disease Father    • Heart attack Father    • Hypertension Mother        Social History     Socioeconomic History   • Marital status:    Tobacco Use   • Smoking status: Never Smoker   • Smokeless tobacco: Never Used   Vaping Use   • Vaping Use: Never used   Substance and Sexual Activity   • Alcohol  "use: Never   • Drug use: Never   • Sexual activity: Defer           ECG 12 Lead    Date/Time: 10/26/2021 11:43 AM  Performed by: Anmol Ngo MD  Authorized by: Anmol Ngo MD   Comparison: compared with previous ECG   Similar to previous ECG  Comparison to previous ECG: Normal sinus rhythm right axis deviation nonspecific ST-T wave changes no ectopy 60/min no significant change from 10/20/2020                Objective:       Physical Exam    /71 (BP Location: Left arm, Patient Position: Sitting, Cuff Size: Adult)   Ht 157.5 cm (62\")   Wt 62.1 kg (137 lb)   SpO2 99%   BMI 25.06 kg/m²   The patient is alert, oriented and in no distress.    Vital signs as noted above.    Head and neck revealed no carotid bruits or jugular venous distension.  No thyromegaly or lymphadenopathy is present.    Lungs clear.  No wheezing.  Breath sounds are normal bilaterally.    Heart normal first and second heart sounds.  No murmur..  No pericardial rub is present.  No gallop is present.    Abdomen soft and nontender.  No organomegaly is present.    Extremities revealed good peripheral pulses without any pedal edema.    Skin warm and dry.    Musculoskeletal system is grossly normal.    CNS grossly normal.    Reviewed and updated.        "

## 2023-02-20 ENCOUNTER — OFFICE VISIT (OUTPATIENT)
Dept: FAMILY MEDICINE CLINIC | Facility: CLINIC | Age: 58
End: 2023-02-20
Payer: COMMERCIAL

## 2023-02-20 VITALS
WEIGHT: 156 LBS | HEART RATE: 80 BPM | BODY MASS INDEX: 28.71 KG/M2 | SYSTOLIC BLOOD PRESSURE: 118 MMHG | OXYGEN SATURATION: 98 % | HEIGHT: 62 IN | RESPIRATION RATE: 16 BRPM | DIASTOLIC BLOOD PRESSURE: 74 MMHG

## 2023-02-20 DIAGNOSIS — Z76.89 ENCOUNTER TO ESTABLISH CARE: Primary | ICD-10-CM

## 2023-02-20 PROCEDURE — 99203 OFFICE O/P NEW LOW 30 MIN: CPT | Performed by: NURSE PRACTITIONER

## 2023-02-20 RX ORDER — LANOLIN ALCOHOL/MO/W.PET/CERES
1000 CREAM (GRAM) TOPICAL DAILY
COMMUNITY

## 2023-02-20 RX ORDER — PROGESTERONE 200 MG/1
200 CAPSULE ORAL DAILY
COMMUNITY

## 2023-02-20 RX ORDER — ACAR/CYST/ALA/Q10/P.SER/BROCC 800-300 MG
5 POWDER IN PACKET (EA) ORAL DAILY
COMMUNITY

## 2023-02-20 RX ORDER — ESTRADIOL 0.1 MG/G
1 CREAM VAGINAL DAILY
COMMUNITY

## 2023-02-20 RX ORDER — THYROID 30 MG/1
1 TABLET ORAL DAILY
COMMUNITY
Start: 2023-02-16

## 2023-02-20 RX ORDER — MAGNESIUM CHLORIDE 64 MG
1 TABLET, DELAYED RELEASE (ENTERIC COATED) ORAL DAILY
COMMUNITY

## 2023-02-20 NOTE — PROGRESS NOTES
"Chief Complaint  Establish Care    Subjective          Liyah Cardenas presents to [facility]  History of Present Illness    Is a new patient, here today to establish care  Has been seeing Dr. Marks in [place] and is considering a change due to difficulty with access to care, she is not entirely decided to change providers at this time    H/o seasonal allergies, gerd  She takes an antihistamine daily, she has also just started on a bioidentical hormone supplement regimen with [employer]    Mammogram at NH in [place] 1/2023  Colonoscopy 2/24/21    Sees GYN, Una Acosta, will see her in May 2023    She does exercise regularly, is working on weight loss  Endorses a fairly healthy diet, is working on making changes to improve it further    She recently had labs done with [facility], will request those results    Review of Systems   Constitutional: Negative.  Negative for activity change, appetite change, fatigue and fever.   Respiratory: Negative.  Negative for cough, shortness of breath and wheezing.    Cardiovascular: Negative.  Negative for chest pain and palpitations.   Gastrointestinal: Negative.  Negative for abdominal pain, constipation and diarrhea.   Genitourinary: Negative.  Negative for dysuria, frequency and urgency.   Musculoskeletal: Negative.  Negative for arthralgias and myalgias.   Allergic/Immunologic: Positive for environmental allergies.        Endorses seasonal allergies, uses a daily antihistamine year round   Neurological: Negative.  Negative for dizziness, weakness and headaches.   Psychiatric/Behavioral: Positive for sleep disturbance. Negative for dysphoric mood. The patient is not nervous/anxious.         Sleeps 5-6 hours/night, feels like the sleep she gets is restless       Objective   Vital Signs:  /74 (BP Location: Left arm)   Pulse 80   Resp 16   Ht 157.5 cm (62\")   Wt 70.8 kg (156 lb)   SpO2 98%  "  BMI 28.53 kg/m²     BP Readings from Last 3 Encounters:   02/20/23 118/74   10/26/21 108/71   05/29/21 115/68        Wt Readings from Last 3 Encounters:   02/20/23 70.8 kg (156 lb)   10/26/21 62.1 kg (137 lb)   05/29/21 64 kg (141 lb 1.5 oz)        BMI is >= 25 and <30. (Overweight) The following options were offered after discussion;: exercise counseling/recommendations and nutrition counseling/recommendations      Physical Exam  Vitals reviewed.   Constitutional:       Appearance: Normal appearance.   Neck:      Vascular: No carotid bruit.   Cardiovascular:      Rate and Rhythm: Normal rate and regular rhythm.      Heart sounds: Normal heart sounds.   Pulmonary:      Effort: Pulmonary effort is normal.      Breath sounds: Normal breath sounds.   Musculoskeletal:      Cervical back: Neck supple. No tenderness.      Right lower leg: No edema.      Left lower leg: No edema.   Lymphadenopathy:      Cervical: No cervical adenopathy.   Skin:     General: Skin is warm.   Neurological:      Mental Status: She is alert and oriented to person, place, and time.   Psychiatric:         Mood and Affect: Mood normal.        Result Review :       Data reviewed: colonoscopy note and other available records          Assessment and Plan    Diagnoses and all orders for this visit:    1. Encounter to establish care (Primary)           I spent 30 minutes caring for Liyah on this date of service. This time includes time spent by me in the following activities:preparing for the visit, reviewing tests, obtaining and/or reviewing a separately obtained history, performing a medically appropriate examination and/or evaluation  and documenting information in the medical record  Follow Up   Return as needed.  Patient was given instructions and counseling regarding her condition or for health maintenance advice. Please see specific information pulled into the AVS if appropriate.

## 2023-08-24 ENCOUNTER — HOSPITAL ENCOUNTER (OUTPATIENT)
Dept: GENERAL RADIOLOGY | Facility: HOSPITAL | Age: 58
Discharge: HOME | End: 2023-08-24
Admitting: INTERNAL MEDICINE
Payer: COMMERCIAL

## 2023-08-24 ENCOUNTER — TRANSCRIBE ORDERS (OUTPATIENT)
Dept: ADMINISTRATIVE | Facility: HOSPITAL | Age: 58
End: 2023-08-24
Payer: COMMERCIAL

## 2023-08-24 DIAGNOSIS — M51.9 LUMBAR DISC DISEASE: Primary | ICD-10-CM

## 2023-08-24 DIAGNOSIS — M51.9 LUMBAR DISC DISEASE: ICD-10-CM

## 2023-08-24 PROCEDURE — 72100 X-RAY EXAM L-S SPINE 2/3 VWS: CPT

## 2023-10-04 ENCOUNTER — TRANSCRIBE ORDERS (OUTPATIENT)
Dept: ADMINISTRATIVE | Facility: HOSPITAL | Age: 58
End: 2023-10-04
Payer: COMMERCIAL

## 2023-10-04 DIAGNOSIS — Z13.6 ENCOUNTER FOR SCREENING FOR VASCULAR DISEASE: Primary | ICD-10-CM

## 2024-03-25 ENCOUNTER — TELEPHONE (OUTPATIENT)
Dept: CARDIOLOGY | Facility: CLINIC | Age: 59
End: 2024-03-25
Payer: COMMERCIAL

## 2024-03-25 ENCOUNTER — HOSPITAL ENCOUNTER (OUTPATIENT)
Dept: CT IMAGING | Facility: HOSPITAL | Age: 59
Discharge: HOME | End: 2024-03-25

## 2024-03-25 ENCOUNTER — HOSPITAL ENCOUNTER (OUTPATIENT)
Dept: CARDIOLOGY | Facility: HOSPITAL | Age: 59
Discharge: HOME | End: 2024-03-25

## 2024-03-25 DIAGNOSIS — Z13.6 ENCOUNTER FOR SCREENING FOR VASCULAR DISEASE: ICD-10-CM

## 2024-03-25 LAB
BH CV VAS SCREENING CAROTID CCA LEFT: 111 CM/SEC
BH CV VAS SCREENING CAROTID CCA RIGHT: 86 CM/SEC
BH CV VAS SCREENING CAROTID ICA LEFT: 85 CM/SEC
BH CV VAS SCREENING CAROTID ICA RIGHT: 73 CM/SEC
BH CV XLRA MEAS - MID AO DIAM: 1.6 CM
BH CV XLRA MEAS - PAD LEFT ABI PT: 1.16
BH CV XLRA MEAS - PAD LEFT ARM: 92 MMHG
BH CV XLRA MEAS - PAD LEFT LEG PT: 116 MMHG
BH CV XLRA MEAS - PAD RIGHT ABI PT: 1.23
BH CV XLRA MEAS - PAD RIGHT ARM: 100 MMHG
BH CV XLRA MEAS - PAD RIGHT LEG PT: 123 MMHG
BH CV XLRA MEAS LEFT DIST CCA EDV: 31.7 CM/SEC
BH CV XLRA MEAS LEFT DIST CCA PSV: 111 CM/SEC
BH CV XLRA MEAS LEFT ICA/CCA RATIO: 0.8
BH CV XLRA MEAS LEFT PROX ICA EDV: -35.4 CM/SEC
BH CV XLRA MEAS LEFT PROX ICA PSV: -85.1 CM/SEC
BH CV XLRA MEAS RIGHT DIST CCA EDV: 22.4 CM/SEC
BH CV XLRA MEAS RIGHT DIST CCA PSV: 85.7 CM/SEC
BH CV XLRA MEAS RIGHT ICA/CCA RATIO: 0.8
BH CV XLRA MEAS RIGHT PROX ICA EDV: -21.7 CM/SEC
BH CV XLRA MEAS RIGHT PROX ICA PSV: -72.7 CM/SEC

## 2024-03-25 PROCEDURE — 75571 CT HRT W/O DYE W/CA TEST: CPT

## 2024-03-25 PROCEDURE — 93799 UNLISTED CV SVC/PROCEDURE: CPT

## 2024-03-25 NOTE — TELEPHONE ENCOUNTER
Triage-please inform patient her vascular screening bundle show normal bilateral carotid arteries, normal size abdominal aorta and normal lower extremity arterial pressures bilateral.  Her coronary calcium score was normal at 0. There was also an Incidental finding of a small left liver cyst noted approximately a half inch in size.  Please let her know that we will forward the results to her PCP, Dr. Too Marks and I would like for her to discuss if any follow-up for left liver cyst is needed with her PCP      Maria Eugenia-please fax printed copies of vascular screening and coronary calcium score to PCP with the attention to review incidental liver cyst finding

## 2024-03-26 NOTE — TELEPHONE ENCOUNTER
Pt called back. Went over results and recommendations. Pt verbalized understanding.    Thank you,    Emily Andre, RN  Triage [facility]  03/26/24 10:27 EDT

## 2024-03-26 NOTE — TELEPHONE ENCOUNTER
Called and left VM, will continue to try to reach pt.    HUB- please put patient straight through to triage    Janeen Nguyễn, RN  Triage RN  03/26/24 08:27 EDT

## 2024-06-24 ENCOUNTER — TRANSCRIBE ORDERS (OUTPATIENT)
Dept: ADMINISTRATIVE | Facility: HOSPITAL | Age: 59
End: 2024-06-24
Payer: COMMERCIAL

## 2024-06-24 DIAGNOSIS — K76.89 FLOATING LIVER: Primary | ICD-10-CM

## 2024-10-23 ENCOUNTER — APPOINTMENT (OUTPATIENT)
Dept: GENERAL RADIOLOGY | Facility: HOSPITAL | Age: 59
End: 2024-10-23
Payer: COMMERCIAL

## 2024-10-23 ENCOUNTER — HOSPITAL ENCOUNTER (EMERGENCY)
Facility: HOSPITAL | Age: 59
Discharge: HOME | End: 2024-10-23
Attending: EMERGENCY MEDICINE | Admitting: EMERGENCY MEDICINE
Payer: COMMERCIAL

## 2024-10-23 VITALS
HEART RATE: 85 BPM | HEIGHT: 62 IN | TEMPERATURE: 98 F | OXYGEN SATURATION: 98 % | DIASTOLIC BLOOD PRESSURE: 71 MMHG | SYSTOLIC BLOOD PRESSURE: 116 MMHG | WEIGHT: 140 LBS | BODY MASS INDEX: 25.76 KG/M2 | RESPIRATION RATE: 16 BRPM

## 2024-10-23 DIAGNOSIS — T14.8XXA WOUND INFECTION: ICD-10-CM

## 2024-10-23 DIAGNOSIS — L08.9 WOUND INFECTION: ICD-10-CM

## 2024-10-23 DIAGNOSIS — J06.9 UPPER RESPIRATORY TRACT INFECTION, UNSPECIFIED TYPE: Primary | ICD-10-CM

## 2024-10-23 LAB
ANION GAP SERPL CALCULATED.3IONS-SCNC: 4.6 MMOL/L (ref 5–15)
BASOPHILS # BLD AUTO: 0.05 10*3/MM3 (ref 0–0.2)
BASOPHILS NFR BLD AUTO: 0.5 % (ref 0–1.5)
BUN SERPL-MCNC: 14 MG/DL (ref 8–23)
BUN/CREAT SERPL: 19.2 (ref 7–25)
CALCIUM SPEC-SCNC: 9.5 MG/DL (ref 8.6–10.5)
CHLORIDE SERPL-SCNC: 100 MMOL/L (ref 98–107)
CO2 SERPL-SCNC: 29.4 MMOL/L (ref 22–29)
CREAT SERPL-MCNC: 0.73 MG/DL (ref 0.57–1)
DEPRECATED RDW RBC AUTO: 44.6 FL (ref 37–54)
EGFRCR SERPLBLD CKD-EPI 2021: 93.7 ML/MIN/1.73
EOSINOPHIL # BLD AUTO: 0.3 10*3/MM3 (ref 0–0.4)
EOSINOPHIL NFR BLD AUTO: 3.1 % (ref 0.3–6.2)
ERYTHROCYTE [DISTWIDTH] IN BLOOD BY AUTOMATED COUNT: 13.1 % (ref 12.3–15.4)
FLUAV SUBTYP SPEC NAA+PROBE: NOT DETECTED
FLUBV RNA ISLT QL NAA+PROBE: NOT DETECTED
GLUCOSE SERPL-MCNC: 83 MG/DL (ref 65–99)
HCT VFR BLD AUTO: 43.5 % (ref 34–46.6)
HGB BLD-MCNC: 13.8 G/DL (ref 12–15.9)
IMM GRANULOCYTES # BLD AUTO: 0.04 10*3/MM3 (ref 0–0.05)
IMM GRANULOCYTES NFR BLD AUTO: 0.4 % (ref 0–0.5)
LYMPHOCYTES # BLD AUTO: 1.42 10*3/MM3 (ref 0.7–3.1)
LYMPHOCYTES NFR BLD AUTO: 14.7 % (ref 19.6–45.3)
MCH RBC QN AUTO: 28.8 PG (ref 26.6–33)
MCHC RBC AUTO-ENTMCNC: 31.7 G/DL (ref 31.5–35.7)
MCV RBC AUTO: 90.6 FL (ref 79–97)
MONOCYTES # BLD AUTO: 0.84 10*3/MM3 (ref 0.1–0.9)
MONOCYTES NFR BLD AUTO: 8.7 % (ref 5–12)
NEUTROPHILS NFR BLD AUTO: 7 10*3/MM3 (ref 1.7–7)
NEUTROPHILS NFR BLD AUTO: 72.6 % (ref 42.7–76)
PLATELET # BLD AUTO: 284 10*3/MM3 (ref 140–450)
PMV BLD AUTO: 8.7 FL (ref 6–12)
POTASSIUM SERPL-SCNC: 4.1 MMOL/L (ref 3.5–5.2)
RBC # BLD AUTO: 4.8 10*6/MM3 (ref 3.77–5.28)
SARS-COV-2 RNA RESP QL NAA+PROBE: NOT DETECTED
SODIUM SERPL-SCNC: 134 MMOL/L (ref 136–145)
WBC NRBC COR # BLD AUTO: 9.65 10*3/MM3 (ref 3.4–10.8)

## 2024-10-23 PROCEDURE — 99283 EMERGENCY DEPT VISIT LOW MDM: CPT

## 2024-10-23 PROCEDURE — 73130 X-RAY EXAM OF HAND: CPT

## 2024-10-23 PROCEDURE — 87636 SARSCOV2 & INF A&B AMP PRB: CPT | Performed by: EMERGENCY MEDICINE

## 2024-10-23 PROCEDURE — 85025 COMPLETE CBC W/AUTO DIFF WBC: CPT | Performed by: EMERGENCY MEDICINE

## 2024-10-23 PROCEDURE — 80048 BASIC METABOLIC PNL TOTAL CA: CPT | Performed by: EMERGENCY MEDICINE

## 2024-10-23 PROCEDURE — 99284 EMERGENCY DEPT VISIT MOD MDM: CPT | Performed by: EMERGENCY MEDICINE

## 2024-10-23 PROCEDURE — 96365 THER/PROPH/DIAG IV INF INIT: CPT

## 2024-10-23 PROCEDURE — 25010000002 CLINDAMYCIN PER 300 MG: Performed by: EMERGENCY MEDICINE

## 2024-10-23 RX ORDER — CLINDAMYCIN HCL 150 MG
450 CAPSULE ORAL 3 TIMES DAILY
Qty: 90 CAPSULE | Refills: 0 | Status: SHIPPED | OUTPATIENT
Start: 2024-10-23 | End: 2024-11-02

## 2024-10-23 RX ORDER — BENZONATATE 200 MG/1
200 CAPSULE ORAL 3 TIMES DAILY PRN
Qty: 15 CAPSULE | Refills: 0 | Status: SHIPPED | OUTPATIENT
Start: 2024-10-23

## 2024-10-23 RX ORDER — CLINDAMYCIN PHOSPHATE 600 MG/50ML
600 INJECTION, SOLUTION INTRAVENOUS ONCE
Status: COMPLETED | OUTPATIENT
Start: 2024-10-23 | End: 2024-10-23

## 2024-10-23 RX ADMIN — CLINDAMYCIN PHOSPHATE 600 MG: 600 INJECTION, SOLUTION INTRAVENOUS at 20:38

## 2024-10-24 NOTE — FSED PROVIDER NOTE
Subjective   History of Present Illness  59 yof complains of hand pain. The patient cut her hand last week. She was carrying a tray going through a doorway when her hand was scraped on the edge of the door frame. She treated the wound by gluing it herself. For the past few days she has had redness pain and swelling to her hand. She denies fever, chills, nausea or vomiting. Pt also notes she has had cough and congestion for the past few days.         Review of Systems   Constitutional: Negative.    HENT:  Positive for congestion and rhinorrhea.    Respiratory:  Positive for cough.    Gastrointestinal: Negative.    Musculoskeletal: Negative.         Hand pain   Skin:  Positive for wound.   Neurological: Negative.  Negative for weakness and numbness.   All other systems reviewed and are negative.      Past Medical History:   Diagnosis Date    Allergic        Allergies   Allergen Reactions    Penicillins Anaphylaxis    Cephalosporins Hives    Shellfish-Derived Products Hives       Past Surgical History:   Procedure Laterality Date    ARM TENDON REPAIR  2015     SECTION      CHOLECYSTECTOMY         Family History   Problem Relation Age of Onset    Heart disease Father     Heart attack Father     Hypertension Mother        Social History     Socioeconomic History    Marital status:    Tobacco Use    Smoking status: Never    Smokeless tobacco: Never   Vaping Use    Vaping status: Never Used   Substance and Sexual Activity    Alcohol use: Never    Drug use: Never    Sexual activity: Defer           Objective   Physical Exam  Vitals reviewed.   Constitutional:       Appearance: Normal appearance.   HENT:      Head: Normocephalic and atraumatic.      Mouth/Throat:      Mouth: Mucous membranes are moist.      Pharynx: Oropharynx is clear.   Eyes:      Extraocular Movements: Extraocular movements intact.      Pupils: Pupils are equal, round, and reactive to light.   Cardiovascular:      Rate and Rhythm: Normal  rate and regular rhythm.      Pulses: Normal pulses.      Heart sounds: Normal heart sounds.   Pulmonary:      Effort: Pulmonary effort is normal.      Breath sounds: Normal breath sounds.   Musculoskeletal:         General: Normal range of motion.        Hands:       Comments: Healing laceration to right 2nd proximal finger  TTP  FROM  Warm and well perfused  NV intact  Erythema to proximal 2nd and 3rd digit  No signs of tenosynovitis    Skin:     General: Skin is warm and dry.      Capillary Refill: Capillary refill takes less than 2 seconds.   Neurological:      Mental Status: She is alert.         Procedures           ED Course                                           Medical Decision Making  This patient presents with initial presentation of local erythema, warmth, swelling concerning for cellulitis vs wound infection. Sensitivity/pain to light touch around the erythematous area. No lymphangitic spread visible and no fluid pockets or fluctuance concerning for abscess noted. Low concern for osteomyelitis. No immune compromise, bullae, pain out of proportion, or rapid progression concerning for necrotizing fasciitis. CBC wnl. Pt given Iv antibiotics in the ER. Importance of follow-up and strict return precautions discussed. Patient to be discharged home with Clindamycin with follow up with their PMD.    Problems Addressed:  Upper respiratory tract infection, unspecified type: complicated acute illness or injury  Wound infection: complicated acute illness or injury    Amount and/or Complexity of Data Reviewed  Labs: ordered.  Radiology: ordered.    Risk  Prescription drug management.        Final diagnoses:   Upper respiratory tract infection, unspecified type   Wound infection       ED Disposition  ED Disposition       ED Disposition   Discharge    Condition   Stable    Comment   --               Lin Castañeda, APRN  [address] [address]  [phone]    Schedule an  appointment as soon as possible for a visit on 10/28/2024      KLEINERT KUTZ [address] [address]  [phone]  Schedule an appointment as soon as possible for a visit on 10/25/2024  re-evaluation         Medication List        New Prescriptions      clindamycin 150 MG capsule  Commonly known as: CLEOCIN  Take 3 capsules by mouth 3 (Three) Times a Day for 10 days.            Changed      benzonatate 200 MG capsule  Commonly known as: TESSALON  Take 1 capsule by mouth 3 (Three) Times a Day As Needed for Cough.  What changed:   medication strength  how much to take            Stop      ondansetron 4 MG tablet  Commonly known as: ZOFRAN               Where to Get Your Medications        These medications were sent to [facility]/pharmacy #[id number] - [place], [place] - [address] [place] [address] - [phone]  - [phone] [address] [place] [address]      Hours: 24-hours Phone: [phone]   benzonatate 200 MG capsule  clindamycin 150 MG capsule

## 2024-10-24 NOTE — DISCHARGE INSTRUCTIONS
Take medication as prescribed. Follow-up with your Doctor or the [facility] in 2-3 days to re-evaluate. Return or go to a hospital for worsening symptoms, fever or for any questions or concerns.

## 2024-11-21 ENCOUNTER — OFFICE (OUTPATIENT)
Dept: URBAN - METROPOLITAN AREA CLINIC 64 | Facility: CLINIC | Age: 59
End: 2024-11-21
Payer: COMMERCIAL

## 2024-11-21 ENCOUNTER — OFFICE (OUTPATIENT)
Age: 59
End: 2024-11-21
Payer: COMMERCIAL

## 2024-11-21 VITALS
DIASTOLIC BLOOD PRESSURE: 65 MMHG | WEIGHT: 150 LBS | DIASTOLIC BLOOD PRESSURE: 65 MMHG | WEIGHT: 150 LBS | HEIGHT: 62 IN | HEIGHT: 62 IN | SYSTOLIC BLOOD PRESSURE: 126 MMHG | HEIGHT: 62 IN | HEART RATE: 73 BPM | WEIGHT: 150 LBS | SYSTOLIC BLOOD PRESSURE: 126 MMHG | DIASTOLIC BLOOD PRESSURE: 65 MMHG | DIASTOLIC BLOOD PRESSURE: 65 MMHG | HEART RATE: 73 BPM | SYSTOLIC BLOOD PRESSURE: 126 MMHG | DIASTOLIC BLOOD PRESSURE: 65 MMHG | WEIGHT: 150 LBS | WEIGHT: 150 LBS | SYSTOLIC BLOOD PRESSURE: 126 MMHG | DIASTOLIC BLOOD PRESSURE: 65 MMHG | SYSTOLIC BLOOD PRESSURE: 126 MMHG | DIASTOLIC BLOOD PRESSURE: 65 MMHG | HEART RATE: 73 BPM | HEIGHT: 62 IN | HEIGHT: 62 IN | WEIGHT: 150 LBS | HEART RATE: 73 BPM | SYSTOLIC BLOOD PRESSURE: 126 MMHG | HEART RATE: 73 BPM | HEIGHT: 62 IN | HEIGHT: 62 IN | WEIGHT: 150 LBS | HEART RATE: 73 BPM | HEART RATE: 73 BPM | SYSTOLIC BLOOD PRESSURE: 126 MMHG

## 2024-11-21 DIAGNOSIS — Z80.0 FAMILY HISTORY OF MALIGNANT NEOPLASM OF DIGESTIVE ORGANS: ICD-10-CM

## 2024-11-21 DIAGNOSIS — R94.5 ABNORMAL RESULTS OF LIVER FUNCTION STUDIES: ICD-10-CM

## 2024-11-21 PROCEDURE — 99204 OFFICE O/P NEW MOD 45 MIN: CPT | Performed by: INTERNAL MEDICINE
